# Patient Record
Sex: MALE | Race: WHITE | NOT HISPANIC OR LATINO | Employment: FULL TIME | URBAN - METROPOLITAN AREA
[De-identification: names, ages, dates, MRNs, and addresses within clinical notes are randomized per-mention and may not be internally consistent; named-entity substitution may affect disease eponyms.]

---

## 2018-12-18 ENCOUNTER — TELEPHONE (OUTPATIENT)
Dept: SCHEDULING | Facility: IMAGING CENTER | Age: 33
End: 2018-12-18

## 2018-12-26 ENCOUNTER — OFFICE VISIT (OUTPATIENT)
Dept: INTERNAL MEDICINE | Facility: MEDICAL CENTER | Age: 33
End: 2018-12-26
Payer: COMMERCIAL

## 2018-12-26 VITALS
SYSTOLIC BLOOD PRESSURE: 136 MMHG | WEIGHT: 205 LBS | OXYGEN SATURATION: 96 % | HEIGHT: 67 IN | BODY MASS INDEX: 32.18 KG/M2 | HEART RATE: 100 BPM | TEMPERATURE: 99.1 F | DIASTOLIC BLOOD PRESSURE: 94 MMHG

## 2018-12-26 DIAGNOSIS — S82.091A OTHER CLOSED FRACTURE OF RIGHT PATELLA, INITIAL ENCOUNTER: Primary | ICD-10-CM

## 2018-12-26 DIAGNOSIS — Z72.51 UNPROTECTED SEX: ICD-10-CM

## 2018-12-26 DIAGNOSIS — Z76.89 ENCOUNTER TO ESTABLISH CARE WITH NEW DOCTOR: ICD-10-CM

## 2018-12-26 PROCEDURE — 99204 OFFICE O/P NEW MOD 45 MIN: CPT | Mod: GC | Performed by: INTERNAL MEDICINE

## 2018-12-26 RX ORDER — IBUPROFEN 200 MG
200 TABLET ORAL EVERY 6 HOURS PRN
COMMUNITY
End: 2022-11-29

## 2018-12-26 ASSESSMENT — ENCOUNTER SYMPTOMS
MYALGIAS: 0
PALPITATIONS: 0
FEVER: 0
DOUBLE VISION: 0
COUGH: 0
POLYDIPSIA: 0
HEMOPTYSIS: 0
HEADACHES: 0
DEPRESSION: 0
CHILLS: 0
HEARTBURN: 0
BLURRED VISION: 0
NAUSEA: 0
NECK PAIN: 0
DIZZINESS: 0

## 2018-12-26 ASSESSMENT — PAIN SCALES - GENERAL: PAINLEVEL: NO PAIN

## 2018-12-26 ASSESSMENT — PATIENT HEALTH QUESTIONNAIRE - PHQ9: CLINICAL INTERPRETATION OF PHQ2 SCORE: 0

## 2018-12-26 NOTE — PROGRESS NOTES
"New Patient to Establish    Reason to establish: New patient to establish    CC: Right knee pain    HPI: Patient is a 33-year-old male who comes into the clinic for right knee pain that started on 11/28/2018 after playing football.  He denies any trauma during the game.  Knee started swelling up with tightness around the right knee.  Patient has a history of these episodes(3-4/year) which usually subside after 2-3 days.  During this occasion, the swelling/pain did not go away.  Patient went to [facility] urgent care week after the event.  X-ray done at the urgent care showed \"nonunion lateral patellar fracture with large joint effusion\".  Patient was discharged with crutches and compression wrap.    Patient recently moved from [facility] after his marriage ended 13-14 years.  During this time patient was found to be hypertensive for which his [place] PCP started antihypertensive medication.  Patient did not know which one but he reports compliance with meds.    Patient reports smoking 1/2 pack per day for the last 18 years.  Binge drinking of 6 beers/day for 2-3 times per week.  He stopped drinking at the age of 25 after being a heavy drinker from [age].  He recently started drinking again due to stress from separation from partner of 14 years.    Patient is sexually active with one partner and no use of condoms.  Partner has had a hysterectomy.  He has never been tested for any STI's.    There are no active problems to display for this patient.      Past Medical History:   Diagnosis Date   • Hypertension        Current Outpatient Prescriptions   Medication Sig Dispense Refill   • ibuprofen (MOTRIN) 200 MG Tab Take 200 mg by mouth every 6 hours as needed.       No current facility-administered medications for this visit.        Allergies as of 12/26/2018   • (No Known Allergies)       Social History     Social History   • Marital status:      Spouse name: N/A   • Number of children: N/A   • " "Years of education: N/A     Occupational History   • Not on file.     Social History Main Topics   • Smoking status: Current Some Day Smoker     Packs/day: 0.50     Years: 18.00     Types: Cigarettes   • Smokeless tobacco: Never Used      Comment: per pt varies sometimes a couple cigarettes sometimes none   • Alcohol use Yes      Comment: per pt 2-3 times a week   • Drug use: No   • Sexual activity: Yes     Partners: Female     Birth control/ protection: Surgical      Comment: pt girlfriend had hysterectomy     Other Topics Concern   • Not on file     Social History Narrative   • No narrative on file       Family History   Problem Relation Age of Onset   • Diabetes Mother    • Stroke Father        History reviewed. No pertinent surgical history.  Review of Systems   Constitutional: Negative for chills and fever.   HENT: Negative for hearing loss and tinnitus.    Eyes: Negative for blurred vision and double vision.   Respiratory: Negative for cough and hemoptysis.    Cardiovascular: Negative for chest pain and palpitations.   Gastrointestinal: Negative for heartburn and nausea.   Genitourinary: Negative for dysuria and urgency.   Musculoskeletal: Negative for myalgias and neck pain.   Skin: Negative for itching and rash.   Neurological: Negative for dizziness and headaches.   Endo/Heme/Allergies: Negative for environmental allergies and polydipsia.   Psychiatric/Behavioral: Negative for depression and suicidal ideas.       /94 (BP Location: Right arm, Patient Position: Sitting, BP Cuff Size: Adult long)   Pulse 100   Temp 37.3 °C (99.1 °F) (Temporal)   Ht 1.69 m (5' 6.53\")   Wt 93 kg (205 lb)   SpO2 96%   BMI 32.56 kg/m²     Physical Exam  General:  Alert and oriented, No apparent distress.    Eyes: Pupils equal and reactive. No scleral icterus.    Throat: Clear no erythema or exudates noted.    Neck: Supple. No lymphadenopathy noted. Thyroid not enlarged.    Lungs: Clear to auscultation and percussion " "bilaterally.    Cardiovascular: Regular rate and rhythm. No murmurs, rubs or gallops.    Abdomen:  Benign. No rebound or guarding noted.    Extremities: 1+ edema at right knee.  Tender to palpation at right lateral knee.  Pulses intact at dorsalis pedis.  3/5 strength at right quadricep.  5/5 at rest and extremities.  No erythema.  No clubbing, cyanosis.    Skin: Clear. No rash or suspicious skin lesions noted.      Assessment and Plan    1. Closed fracture of right patella, initial encounter  -Patient is a 33-year-old male who comes into the clinic for right knee pain that started on 11/28/2018 after playing football.  -X-ray done at the urgent care showed \"nonunion lateral patellar fracture with large joint effusion\".  -1/10 on pain scale    Plan  -Advised patient to avoid any strenuous activity  -Referral to orthopedics  -PRN Ibuprofen     2. Unprotected sex  -Patient reports being sexually active with one partner without condom use.  Partner has had a hysterectomy in the past  -He was never tested for STI's    Plan  Chlamydia/gonorrhea  Syphilis  HIV test were upper ordered      3. Encounter to establish care with new doctor  Patient recently moved from [facility] after separation from wife for 14 years  He is also here to establish new PCP  Normal blood work on record.    Plan  CBC/CMP  TSH with reflex T4  Lipid panel    4. HTN  -Blood pressure at this visit is 136/94  -Unknown medication  -Advised pt to either call in regarding which medication he's taking      Followup: No Follow-up on file.    Risk Assessment (discuss potential complications a function of chronic problems):     Complexity (discuss number of co-morbidities):     Signed by: Tanner Hahn M.D.  "

## 2021-08-14 ENCOUNTER — HOSPITAL ENCOUNTER (EMERGENCY)
Facility: MEDICAL CENTER | Age: 36
End: 2021-08-14
Attending: EMERGENCY MEDICINE | Admitting: EMERGENCY MEDICINE
Payer: COMMERCIAL

## 2021-08-14 VITALS
HEIGHT: 67 IN | RESPIRATION RATE: 15 BRPM | HEART RATE: 98 BPM | OXYGEN SATURATION: 94 % | SYSTOLIC BLOOD PRESSURE: 151 MMHG | TEMPERATURE: 98 F | DIASTOLIC BLOOD PRESSURE: 93 MMHG | WEIGHT: 219.36 LBS | BODY MASS INDEX: 34.43 KG/M2

## 2021-08-14 DIAGNOSIS — J02.9 PHARYNGITIS, UNSPECIFIED ETIOLOGY: ICD-10-CM

## 2021-08-14 DIAGNOSIS — H66.90 ACUTE OTITIS MEDIA, UNSPECIFIED OTITIS MEDIA TYPE: ICD-10-CM

## 2021-08-14 LAB — S PYO DNA SPEC NAA+PROBE: NOT DETECTED

## 2021-08-14 PROCEDURE — 700111 HCHG RX REV CODE 636 W/ 250 OVERRIDE (IP): Performed by: EMERGENCY MEDICINE

## 2021-08-14 PROCEDURE — 87651 STREP A DNA AMP PROBE: CPT

## 2021-08-14 PROCEDURE — 99283 EMERGENCY DEPT VISIT LOW MDM: CPT

## 2021-08-14 RX ORDER — AMOXICILLIN 500 MG/1
1000 CAPSULE ORAL DAILY
Qty: 20 CAPSULE | Refills: 0 | Status: SHIPPED | OUTPATIENT
Start: 2021-08-14 | End: 2021-08-24

## 2021-08-14 RX ORDER — DEXAMETHASONE SODIUM PHOSPHATE 10 MG/ML
10 INJECTION, SOLUTION INTRAMUSCULAR; INTRAVENOUS ONCE
Status: COMPLETED | OUTPATIENT
Start: 2021-08-14 | End: 2021-08-14

## 2021-08-14 RX ADMIN — DEXAMETHASONE SODIUM PHOSPHATE 10 MG: 10 INJECTION INTRAMUSCULAR; INTRAVENOUS at 05:01

## 2021-08-14 ASSESSMENT — ENCOUNTER SYMPTOMS
HEADACHES: 0
SHORTNESS OF BREATH: 0
COUGH: 0
STRIDOR: 0
VOMITING: 0
MYALGIAS: 0
SORE THROAT: 1
FEVER: 0
NAUSEA: 0

## 2021-08-14 NOTE — ED TRIAGE NOTES
"Chief Complaint   Patient presents with   • Tongue Swelling     Pt c/o of swollen tongue, paitent having difficulty swallowing. Denies SOB or difficulty breathinging. Started yesterday morning.    • Difficulty Swallowing     Patient states it is painful to swallow, paitent lympsh nodes are swollen.        34 yo M to triage for above complaint. Patient states yesterday morning he woke up feeling he had a \"frog in his throat.\" Patient c/o of pain when swallowing and states that he feels like his tongue swollen. Denies difficulty breathing, or SOB. Reports swollen lymph nodes near the submandibular area bilateral. Denies fevers. Denies history of strep throat. SpO2 >90%. GCS 15    Pt is alert and oriented, speaking in full sentences, follows commands and responds appropriately to questions. NAD. Resp are even and unlabored.      Pt placed in lobby. Pt educated on triage process. Pt encouraged to alert staff for any changes.     Patient and staff wearing appropriate PPE    BP (!) 165/127   Pulse (!) 116   Temp 36.9 °C (98.4 °F) (Oral)   Resp 16   Ht 1.702 m (5' 7\")   Wt 99.5 kg (219 lb 5.7 oz)   SpO2 95%   BMI 34.36 kg/m²   "

## 2021-08-14 NOTE — DISCHARGE INSTRUCTIONS
If you develop increased symptoms, difficulty swallowing or breathing please return for reevaluation.  Anticipate you should begin to feel better in the next 24 to 48 hours.

## 2021-08-14 NOTE — ED PROVIDER NOTES
ED Provider Note    ED Provider Note    Primary care provider: Tanner Hahn M.D. (Inactive)  Means of arrival: POV  History obtained from: patient  History limited by: NOne    CHIEF COMPLAINT  Chief Complaint   Patient presents with   • Tongue Swelling     Pt c/o of swollen tongue, paitent having difficulty swallowing. Denies SOB or difficulty breathinging. Started yesterday morning.    • Difficulty Swallowing     Patient states it is painful to swallow, paitent lympsh nodes are swollen.        HPI  Ron Lagunas is a 35 y.o. male who presents to the Emergency Department with a chief complaint of a sore throat and difficulty swallowing.  Patient states it started yesterday morning but was more severe, tonight, it worsened, prompting his ED visit.  He actually states that he panicked a bit.  It is more swelling at the back of his throat and in his submandibular area.  He denies a fever.  He does have bilateral ear pain.  He was able to tolerate Ramen last night.  No nausea or vomiting.  No known sick contacts.  He states he is overall healthy and rarely gets sick.    REVIEW OF SYSTEMS  Review of Systems   Constitutional: Negative for fever.   HENT: Positive for sore throat.    Respiratory: Negative for cough, shortness of breath and stridor.    Gastrointestinal: Negative for nausea and vomiting.   Musculoskeletal: Negative for myalgias.   Neurological: Negative for headaches.   All other systems reviewed and are negative.      PAST MEDICAL HISTORY   has a past medical history of Hypertension.    SURGICAL HISTORY  patient denies any surgical history    SOCIAL HISTORY  Social History     Tobacco Use   • Smoking status: Current Some Day Smoker     Packs/day: 0.50     Years: 18.00     Pack years: 9.00     Types: Cigarettes   • Smokeless tobacco: Former User   • Tobacco comment: per pt varies sometimes a couple cigarettes sometimes none   Vaping Use   • Vaping Use: Never used   Substance Use Topics   • Alcohol use:  "Not Currently     Comment: per pt 2-3 times a week   • Drug use: No      Social History     Substance and Sexual Activity   Drug Use No       FAMILY HISTORY  Family History   Problem Relation Age of Onset   • Diabetes Mother    • Stroke Father        CURRENT MEDICATIONS  Home Medications     Reviewed by Ping Rosa R.N. (Registered Nurse) on 08/14/21 at 0350  Med List Status: Partial   Medication Last Dose Status   ibuprofen (MOTRIN) 200 MG Tab  Active                ALLERGIES  No Known Allergies    PHYSICAL EXAM  VITAL SIGNS: /93   Pulse 98   Temp 36.7 °C (98 °F) (Temporal)   Resp 15   Ht 1.702 m (5' 7\")   Wt 99.5 kg (219 lb 5.7 oz)   SpO2 94%   BMI 34.36 kg/m²   Vitals reviewed.  Constitutional: Patient is oriented to person, place, and time. Appears well-developed and well-nourished.  Mild distress.    Head: Normocephalic and atraumatic.   Ears: Normal external ears bilaterally.  TMs appear erythematous, dull.  Mouth/Throat: Oropharynx is clear and moist, no exudates. Patient has bilateral, symmetric, soft tissue swelling and symmetric tonsillar swelling.  Eyes: Conjunctivae are normal. Pupils are equal and round.  Neck: Normal range of motion. Neck supple.  Cardiovascular: Mild tachycardia, regular rhythm and normal heart sounds.   Pulmonary/Chest: Effort normal and breath sounds normal. No respiratory distress, no wheezes.  Abdominal: Soft. Bowel sounds are normal. There is no tenderness.  Musculoskeletal: No edema   Lymphadenopathy: Bilateral anterior cervical adenopathy and submandibular lymphadenopathy.   Neurological: No focal deficits.   Skin: Skin is warm and dry. No erythema. No pallor.   Psychiatric: Patient has a normal mood and affect.     LABS  Results for orders placed or performed during the hospital encounter of 08/14/21   Group A Strep by PCR    Specimen: Throat   Result Value Ref Range    Group A Strep by PCR Not Detected Not Detected       All labs reviewed by " me.    COURSE & MEDICAL DECISION MAKING  Pertinent Labs & Imaging studies reviewed. (See chart for details)    Obtained and reviewed past medical records.  Patient's has 10other encounter in our EMR.  In 2018 he was seen in the internal medicine resident clinic to establish care.    4:23 AM - Patient seen and examined at bedside.  This is a pleasant 35-year-old male.  He presents with an isolated sore throat.  He denies a fever.  He is a mild tachycardia in the low 100s.  He is able to swallow.  He is managing his own secretions.  No clinical evidence of Ludwigs angina at this time. his voice is unchanged.  He has symmetric edema and erythema to his posterior oropharynx.  Clinically, his exam is not suggestive of peritonsillar abscess.  Patient be treated with Decadron.  I have obtained a strep swab.  I discussed with the patient viral versus bacterial etiology.  If strep is positive, will plan for treatment with antibiotics otherwise, we discussed supportive care for viral illnesses.    0640AM patient is reevaluated at the bedside.  Strep is negative.  Patient is not interested in Covid swabbing.  He remains afebrile.  There is no increased work of breathing.  He is not hypoxic or tachypneic.  He is not tachycardic.  He does have concern for ear infection.  And I have advised treatment for this.  He will be discharged home with a course of amoxicillin.  He is otherwise well-appearing and nontoxic.  He is given strict return precautions.  If he develops any difficulty breathing, increased swelling or in general, does not feel as though he is improving, he is advised to return for reevaluation and he is agreeable to this plan of care.  He is discharged in stable condition.      FINAL IMPRESSION  1. Pharyngitis, unspecified etiology    2. Acute otitis media, unspecified otitis media type

## 2022-02-18 ENCOUNTER — OFFICE VISIT (OUTPATIENT)
Dept: INTERNAL MEDICINE | Facility: OTHER | Age: 37
End: 2022-02-18
Payer: COMMERCIAL

## 2022-02-18 VITALS
TEMPERATURE: 98.3 F | HEART RATE: 96 BPM | DIASTOLIC BLOOD PRESSURE: 100 MMHG | OXYGEN SATURATION: 93 % | SYSTOLIC BLOOD PRESSURE: 159 MMHG

## 2022-02-18 DIAGNOSIS — Z72.0 TOBACCO ABUSE: ICD-10-CM

## 2022-02-18 DIAGNOSIS — I10 HYPERTENSION, UNSPECIFIED TYPE: ICD-10-CM

## 2022-02-18 DIAGNOSIS — E66.9 OBESITY (BMI 30-39.9): ICD-10-CM

## 2022-02-18 DIAGNOSIS — M25.461 SWOLLEN R KNEE: ICD-10-CM

## 2022-02-18 PROBLEM — Q74.1: Status: ACTIVE | Noted: 2019-01-04

## 2022-02-18 PROCEDURE — 99213 OFFICE O/P EST LOW 20 MIN: CPT | Mod: GE | Performed by: HOSPITALIST

## 2022-02-18 RX ORDER — BLOOD PRESSURE TEST KIT
1 KIT MISCELLANEOUS ONCE
Qty: 1 KIT | Refills: 0 | Status: SHIPPED | OUTPATIENT
Start: 2022-02-18 | End: 2022-02-18

## 2022-02-18 RX ORDER — IBUPROFEN 800 MG/1
800 TABLET ORAL
Qty: 8 TABLET | Refills: 0 | Status: SHIPPED | OUTPATIENT
Start: 2022-02-18 | End: 2022-11-29

## 2022-02-18 RX ORDER — AMLODIPINE BESYLATE 5 MG/1
5 TABLET ORAL DAILY
Qty: 30 TABLET | Refills: 1 | Status: SHIPPED | OUTPATIENT
Start: 2022-02-18 | End: 2022-11-29 | Stop reason: SDUPTHER

## 2022-02-18 ASSESSMENT — PATIENT HEALTH QUESTIONNAIRE - PHQ9: CLINICAL INTERPRETATION OF PHQ2 SCORE: 0

## 2022-02-18 NOTE — PATIENT INSTRUCTIONS
Please Take only 3 of the 800mg of ibuprofen a day with meals. Only take 1 today (2/18/21), 3 on Saturday, 3 on Sunday, and 1 Monday morning. If the swelling is not improved on Monday, please proceed to an orthopedic urgent care so that the fluid in your knee can be removed and analyzed.     Please follow-up in 2 weeks after starting the amlodipine to have you blood pressure checked; please complete lab work by this 2-week visit.

## 2022-02-20 ASSESSMENT — ENCOUNTER SYMPTOMS
FALLS: 0
CHILLS: 0
FEVER: 0
DIZZINESS: 1
DIARRHEA: 0
BLURRED VISION: 0
CLAUDICATION: 0
HEADACHES: 0
ABDOMINAL PAIN: 0
SHORTNESS OF BREATH: 0
CONSTIPATION: 0

## 2022-02-20 NOTE — PROGRESS NOTES
"Subjective     Ron Lagunas is a 36 y.o. male who presents with New Patient, Knee Pain (/Right knee, x4 days), and Hypertension (Follow up)          1. Swollen R knee  Patient reports waking up 3 to 4 days ago with his right knee swollen.  Patient reports that he injured his knee in 2018 and since then has had some right knee swelling and pain with cold temperatures.  Patient reports that he felt the same kind of sensation occurring 5 days ago, with the swelling and decreased ROM worsening 3 to 4 days ago. Patient reports that he presents for evaluation because the swelling has failed to resolve despite rest.  Patient also reports difficulty bending his right knee without significant pain.  Patient reports he has been taking 200 mg ibuprofen and low-dose Tylenol for the pain as well as implementing elevation and use of ice.  Patient reports that during previous incidences when it was cold, his right knee swelling usually resolved in 1 to 2 days.  Patient denies any inciting trauma or injury.  Patient reports his diet consist mainly of pork chops, chicken, and pizza.  Patient reports that he last drank alcohol 3-4 days (prior to the right knee swelling) and had 3-4 beers.  Patient also reports an incident in which his left big toe was swollen.  Patient reports that his left great toe was swollen and painful.  He reports he did not seek medical attention and it resolved with time.  Patient reports that he has been using crutches for ambulation secondary to his right knee pain.  Patient reports that since he injured his right knee in 2018 he sometimes appreciates right knee clicking.  Patient reports that if his right knee swelling is secondary to gout, he is not interested in any gout Medication because \"he has heard about the side effects\".  Patient is adverse to needles, and is not open to having his right knee tapped and the fluid analyzed at this time.    2. Hypertension, unspecified type  Patient reports " "that since high school he has had high blood pressure.  He also reports that both of this parents and both of his brothers have high blood pressure. Patient denies any headache or blurry vision he does report some lightheadedness.  Patient reports he does not take his blood pressure at home and he is not interested in a blood pressure cuff because he is not sure if insurance can cover it.  Patient reports at 1 point he was on medication for his high blood pressure, but he is unsure what the medication is.  Patient reports that his exercise is limited secondary to his right knee injury in 2018.  His diet consist primarily of sodium heavy foods such as pizza, chicken, and pork chops.  Patient reports that because he had such a long history with high blood pressure, he thinks it is important to start taking medications as opposed to just lifestyle modifications to control his blood pressure.    3. Tobacco abuse  Patient reports intermittent tobacco use.  Patient reports that he has been smoking since the age of 16 years old, but has not had a cigarette in the last 3 to 4 days.  Patient reports that he usually smokes when he drinks.  Patient is not interested in discussing tobacco cessation at this time because he reports he only smokes \"now and then\".  Patient reports that he also uses marijuana intermittently.  Patient reports that when he smokes he also usually needs to drink because \"they go together\".    4. Obesity (BMI 30-39.9)  Patient reports that he is aware of his weight and how it can negatively affect his health.  Patient reports that once he can get his knee swelling under control and his knee pain he can incorporate more exercise into his daily regimen.  Patient is not interested in discussing diet modifications at this time but will consider incorporating more exercise.    Review of Systems   Constitutional: Negative for chills, fever and malaise/fatigue.   HENT: Negative for congestion.    Eyes: Negative " for blurred vision.   Respiratory: Negative for shortness of breath.    Cardiovascular: Negative for chest pain and claudication.   Gastrointestinal: Negative for abdominal pain, constipation and diarrhea.   Musculoskeletal: Positive for joint pain. Negative for falls.        Joint swelling   Neurological: Positive for dizziness. Negative for headaches.          Objective     /100 (BP Location: Right arm, Patient Position: Sitting, BP Cuff Size: Adult)   Pulse 96   Temp 36.8 °C (98.3 °F) (Temporal)   SpO2 93%      Physical Exam  Constitutional:       Appearance: Normal appearance. He is obese.   HENT:      Head: Normocephalic and atraumatic.   Cardiovascular:      Rate and Rhythm: Normal rate and regular rhythm.      Pulses: Normal pulses.   Pulmonary:      Effort: Pulmonary effort is normal. No respiratory distress.   Abdominal:      General: There is distension.      Tenderness: There is no guarding.   Musculoskeletal:         General: Swelling and tenderness present.      Comments: Right knee is swollen without erythema.  Right knee is extremely tender to touch.  Right knee without range of motion secondary to pain.   Skin:     General: Skin is warm and dry.      Findings: No erythema.   Neurological:      General: No focal deficit present.      Mental Status: He is alert and oriented to person, place, and time.   Psychiatric:         Mood and Affect: Mood normal.         Behavior: Behavior normal.              Assessment & Plan        1. Swollen R knee  Patient presents with 3 to 4 days of right knee swelling and pain.  Patient woke up with the knee swollen and extremely painful the timeframe also corresponds to an excess of alcohol use.  Patient also reports an incident in which his left big toe was swollen and painful.  Patient's right swollen knee likely secondary to an acute gout attack.  But could also be from trauma.  Patient is adverse to arthrocentesis at this time as well as acute and chronic  gout medication.  Patient is open to an NSAID however and will proceed to an orthopedic urgent care on Monday should the swelling and pain not subside.  - Ibuprofen 800mg TID for 3 days  - Rest, ice, and elevation  - Continue to use crutches for ambulation  - Presents orthopedic urgent care on Monday, February 21 for arthrocentesis and further evaluation if swelling and pain does not subside  - URIC ACID; Future  - TSH+FREE T4    2. Hypertension, unspecified type  Home BP kit will be ordered, to encourage blood pressure measurements at home.   Home blood pressure monitoring:  - check your blood pressure every day and put it in a log  - pick a different time each day so we can see how it varies throughout the day  - when you check your blood pressure: make sure you sit quietly for 5-10 minutes beforehand, keep both feet flat on the ground, and make sure you use an arm cuff at heart height  Lifestyle factors to help manage blood pressure:  1) reduce stress with daily activity, consider yoga, breathing exercises (like 4-7-8 breathing technique)   2) reduce Na to <2000 mg/day  3) DASH diet     4) 200-300min/week cardio, which you can build up to.    - Amlodipine 5mg daily  - CBC WITH DIFFERENTIAL; Future  - Comp Metabolic Panel; Future  - Lipid Profile; Future  - HEMOGLOBIN A1C; Future  - TSH+FREE T4  - Patient is to follow-up in 2 weeks to assess the effectiveness of amlodipine as well as exercise and diet measures on blood pressure    3. Tobacco abuse  Discussed smoking cessation. Patient not interested in quitting at this time.   - CBC WITH DIFFERENTIAL; Future  - Revisit smoking cessation conversation at next visit    4. Obesity (BMI 30-39.9)  - Encouraged diet high in fruits, vegetables, and fiber. And a diet low in salt, refined carbohydrates, cholesterol, saturated fat, and trans fatty acids.    - Encouraged  a minimum of 30 minutes of moderate intensity aerobic exercise (eg, brisk walking) is recommended on five  days each week. Or 20 minutes of vigorous-intensity aerobic exercise (eg, jogging) on three days each week.   - URIC ACID; Future  - Lipid Profile; Future  - HEMOGLOBIN A1C; Future  - TSH+FREE T4    Follow-up instructions: Please Take only 3 of the 800mg of ibuprofen a day with meals. Only take 1 today (2/18/21), 3 on Saturday, 3 on Sunday, and 1 Monday morning. If the swelling is not improved on Monday, please proceed to an orthopedic urgent care so that the fluid in your knee can be removed and analyzed.     Please follow-up in 2 weeks after starting the amlodipine to have you blood pressure checked; please complete lab work by this 2-week visit.

## 2022-11-29 ENCOUNTER — OFFICE VISIT (OUTPATIENT)
Dept: INTERNAL MEDICINE | Facility: OTHER | Age: 37
End: 2022-11-29
Payer: COMMERCIAL

## 2022-11-29 VITALS
HEIGHT: 68 IN | SYSTOLIC BLOOD PRESSURE: 162 MMHG | TEMPERATURE: 98.7 F | OXYGEN SATURATION: 95 % | HEART RATE: 94 BPM | WEIGHT: 216 LBS | DIASTOLIC BLOOD PRESSURE: 86 MMHG | BODY MASS INDEX: 32.74 KG/M2

## 2022-11-29 DIAGNOSIS — R20.0 BILATERAL FINGER NUMBNESS: ICD-10-CM

## 2022-11-29 DIAGNOSIS — E66.9 OBESITY (BMI 30-39.9): ICD-10-CM

## 2022-11-29 DIAGNOSIS — I10 HYPERTENSION, UNSPECIFIED TYPE: ICD-10-CM

## 2022-11-29 PROCEDURE — 99213 OFFICE O/P EST LOW 20 MIN: CPT | Mod: GE | Performed by: STUDENT IN AN ORGANIZED HEALTH CARE EDUCATION/TRAINING PROGRAM

## 2022-11-29 RX ORDER — AMLODIPINE BESYLATE 5 MG/1
5 TABLET ORAL DAILY
Qty: 30 TABLET | Refills: 1 | Status: SHIPPED | OUTPATIENT
Start: 2022-11-29 | End: 2023-03-30 | Stop reason: SDUPTHER

## 2022-11-29 RX ORDER — LISINOPRIL 10 MG/1
10 TABLET ORAL DAILY
Qty: 30 TABLET | Refills: 3 | Status: SHIPPED | OUTPATIENT
Start: 2022-11-29 | End: 2023-03-30

## 2022-11-29 ASSESSMENT — ENCOUNTER SYMPTOMS
DOUBLE VISION: 0
DIZZINESS: 0
BLURRED VISION: 0
FEVER: 0
HEADACHES: 0
CHILLS: 0
PALPITATIONS: 0
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
## 2023-03-30 ENCOUNTER — OFFICE VISIT (OUTPATIENT)
Dept: INTERNAL MEDICINE | Facility: OTHER | Age: 38
End: 2023-03-30
Payer: COMMERCIAL

## 2023-03-30 VITALS
TEMPERATURE: 97.5 F | WEIGHT: 206.4 LBS | BODY MASS INDEX: 31.28 KG/M2 | SYSTOLIC BLOOD PRESSURE: 155 MMHG | OXYGEN SATURATION: 95 % | HEART RATE: 90 BPM | HEIGHT: 68 IN | DIASTOLIC BLOOD PRESSURE: 101 MMHG

## 2023-03-30 DIAGNOSIS — F33.2 SEVERE EPISODE OF RECURRENT MAJOR DEPRESSIVE DISORDER, WITHOUT PSYCHOTIC FEATURES (HCC): ICD-10-CM

## 2023-03-30 DIAGNOSIS — I10 PRIMARY HYPERTENSION: ICD-10-CM

## 2023-03-30 DIAGNOSIS — Z13.228 SCREENING FOR METABOLIC DISORDER: ICD-10-CM

## 2023-03-30 DIAGNOSIS — Z11.3 SCREEN FOR STD (SEXUALLY TRANSMITTED DISEASE): ICD-10-CM

## 2023-03-30 DIAGNOSIS — E66.9 OBESITY (BMI 30-39.9): ICD-10-CM

## 2023-03-30 PROBLEM — F32.9 MAJOR DEPRESSIVE DISORDER: Status: ACTIVE | Noted: 2023-03-30

## 2023-03-30 PROCEDURE — 99214 OFFICE O/P EST MOD 30 MIN: CPT | Mod: GC | Performed by: INTERNAL MEDICINE

## 2023-03-30 RX ORDER — AMLODIPINE BESYLATE 5 MG/1
5 TABLET ORAL DAILY
Qty: 30 TABLET | Refills: 1 | Status: SHIPPED | OUTPATIENT
Start: 2023-03-30 | End: 2023-09-27 | Stop reason: SDUPTHER

## 2023-03-30 ASSESSMENT — PATIENT HEALTH QUESTIONNAIRE - PHQ9
CLINICAL INTERPRETATION OF PHQ2 SCORE: 5
5. POOR APPETITE OR OVEREATING: 3 - NEARLY EVERY DAY
SUM OF ALL RESPONSES TO PHQ QUESTIONS 1-9: 22

## 2023-03-30 NOTE — PATIENT INSTRUCTIONS
Placed referral to psychology  Start taking amlodipine again for blood pressure  Start taking sertraline for depression  Do labs (fasting) now  Measure blood pressure once daily at the same time of the day. No smoking, alcohol, drugs before measuring blood pressure.  Rest for 10 minutes before measuring blood pressure with feet down on the ground and arm at heart level in sitting position. Measure blood pressure for 2 weeks before next visit. Bring the blood pressure log on next visit.

## 2023-03-31 ASSESSMENT — ENCOUNTER SYMPTOMS
HEADACHES: 0
FEVER: 0
DOUBLE VISION: 0
DIZZINESS: 0
PALPITATIONS: 0
CHILLS: 0
BLURRED VISION: 0

## 2023-03-31 NOTE — PROGRESS NOTES
Chief Complaint   Patient presents with    Depression    Anxiety     Patient here for depression and anxiety       HISTORY OF PRESENT ILLNESS: Patient is a 37 y.o. male established patient who presents today for the following.      #Hypertension  He states that he has not been taking blood pressure medications for 3 months.  Blood pressure in clinic is elevated at 155/101 today.   Denies chest pain, palpitation, shortness of breath, weakness, dizziness/lightheadedness, facial droop, paresthesia, vision change     He states the labs are not done for this visit because he hates needles. He did not get vaccines either because of the same reason. Emphasized the importance of getting labs and vaccines. For now, patient is okay with getting labs since this is more pressing issue in the setting of elevated blood pressure.      #Depression  PHQ was documented in clinic today. PHQ 9 score was 22. He continues to feel depressed for a long time and got worse due to family issues. He is okay with starting sertraline and seeing behavioral health    #smoking  He is still smoking and smokes 1 pack a day.  He refuses to quit at this time because he wants to prioritize dealing with depression.    He works in construction. He states that he will go back to work soon.      He drinks at night and only drinks 1 beer. no current use of recreational drugs. used to smoke weed.  He is  and has one son who is 16.     Past Medical History:   Diagnosis Date    Hypertension        Patient Active Problem List    Diagnosis Date Noted    Major depressive disorder 03/30/2023    Hypertension 02/18/2022    Swollen R knee 02/18/2022    Tobacco abuse 02/18/2022    Obesity (BMI 30-39.9) 02/18/2022    Bifid patella 01/04/2019       Allergies:Patient has no known allergies.    Current Outpatient Medications   Medication Sig Dispense Refill    sertraline (ZOLOFT) 50 MG Tab Take 1 Tablet by mouth every day. 30 Tablet 11    amLODIPine (NORVASC) 5 MG  "Tab Take 1 Tablet by mouth every day. 30 Tablet 1     No current facility-administered medications for this visit.       Social History     Tobacco Use    Smoking status: Some Days     Packs/day: 0.50     Years: 18.00     Pack years: 9.00     Types: Cigarettes    Smokeless tobacco: Former    Tobacco comments:     occasional   Vaping Use    Vaping Use: Never used   Substance Use Topics    Alcohol use: Yes     Comment: per pt 2-3 times a week    Drug use: Yes     Types: Marijuana       Family History   Problem Relation Age of Onset    Diabetes Mother     Stroke Father          Review of Systems   Review of Systems   Constitutional:  Negative for chills and fever.   HENT:  Negative for hearing loss and tinnitus.    Eyes:  Negative for blurred vision and double vision.   Cardiovascular:  Negative for chest pain and palpitations.   Neurological:  Negative for dizziness and headaches.     Exam:  BP (!) 155/101 (BP Location: Right arm, Patient Position: Sitting, BP Cuff Size: Large adult)   Pulse 90   Temp 36.4 °C (97.5 °F) (Temporal)   Ht 1.715 m (5' 7.5\")   Wt 93.6 kg (206 lb 6.4 oz)   SpO2 95%  Body mass index is 31.85 kg/m².    Constitutional:  Not in acute distress, well appearing.  HEENT:   NC/AT  Cardiovascular: Regular rate and rhythm. No murmurs or gallops.      Lungs:   Clear to auscultation bilaterally. No wheezes or crackles. No respiratory distress.  Abdomen: Not distended, soft, not tender. No guarding or rigidity. No masses.  Extremities:  No cyanosis/clubbing/edema. No obvious deformities.  Positive Phalen sign  Skin:  Warm and dry.  No visible rashes.  Neurologic: Alert & oriented x 3, CN II-XII grossly intact, strength and sensation grossly intact.  No focal deficits noted.  Psychiatric:  Affect normal, mood normal, judgment normal.    Assessment/Plan:     1. Severe episode of recurrent major depressive disorder, without psychotic features (HCC)  PHQ was documented in clinic today. PHQ 9 score was " 22. He continues to feel depressed for a long time and got worse due to family issues. He is okay with starting sertraline and seeing behavioral health      Plan:  Start sertraline 50 mg daily  - CBC WITHOUT DIFFERENTIAL; Future  - Comp Metabolic Panel; Future  - Patient has been identified as having a positive depression screening. Appropriate orders and counseling have been given.  - Referral to Psychology  - Referral to Behavioral Health  - TSH WITH REFLEX TO FT4; Future    2. Obesity (BMI 30-39.9)  Declines nutrition referral  - Patient identified as having weight management issue.  Appropriate orders and counseling given.    3. Primary hypertension  He states that he has not been taking blood pressure medications for 3 months.   Blood pressure in clinic is elevated at 155/101 today.    Plan:  Restart amlodipine 5 mg daily, hold off on restarting lisinopril sine we don't have labs.    - Comp Metabolic Panel; Future  - MICROALBUMIN CREAT RATIO URINE; Future  - TSH WITH REFLEX TO FT4; Future    4. Screen for STD (sexually transmitted disease)  - HIV AG/AB COMBO ASSAY SCREENING; Future  - HEP C VIRUS ANTIBODY; Future  - RPR (SYPHILIS); Future    5. Screening for metabolic disorder  - Lipid Profile; Future

## 2023-04-05 ASSESSMENT — ANXIETY QUESTIONNAIRES
GAD7 TOTAL SCORE: 19
1. FEELING NERVOUS, ANXIOUS, OR ON EDGE: NEARLY EVERY DAY
3. WORRYING TOO MUCH ABOUT DIFFERENT THINGS: MORE THAN HALF THE DAYS
6. BECOMING EASILY ANNOYED OR IRRITABLE: NEARLY EVERY DAY
4. TROUBLE RELAXING: NEARLY EVERY DAY
2. NOT BEING ABLE TO STOP OR CONTROL WORRYING: NEARLY EVERY DAY
5. BEING SO RESTLESS THAT IT IS HARD TO SIT STILL: MORE THAN HALF THE DAYS
7. FEELING AFRAID AS IF SOMETHING AWFUL MIGHT HAPPEN: NEARLY EVERY DAY
IF YOU CHECKED OFF ANY PROBLEMS ON THIS QUESTIONNAIRE, HOW DIFFICULT HAVE THESE PROBLEMS MADE IT FOR YOU TO DO YOUR WORK, TAKE CARE OF THINGS AT HOME, OR GET ALONG WITH OTHER PEOPLE: VERY DIFFICULT

## 2023-05-15 ENCOUNTER — APPOINTMENT (OUTPATIENT)
Dept: INTERNAL MEDICINE | Facility: OTHER | Age: 38
End: 2023-05-15
Payer: COMMERCIAL

## 2023-09-27 ENCOUNTER — OFFICE VISIT (OUTPATIENT)
Dept: INTERNAL MEDICINE | Facility: OTHER | Age: 38
End: 2023-09-27
Payer: COMMERCIAL

## 2023-09-27 VITALS
HEART RATE: 94 BPM | HEIGHT: 68 IN | WEIGHT: 199.2 LBS | OXYGEN SATURATION: 94 % | DIASTOLIC BLOOD PRESSURE: 80 MMHG | BODY MASS INDEX: 30.19 KG/M2 | TEMPERATURE: 99.1 F | SYSTOLIC BLOOD PRESSURE: 130 MMHG

## 2023-09-27 DIAGNOSIS — F33.42 RECURRENT MAJOR DEPRESSIVE DISORDER, IN FULL REMISSION (HCC): ICD-10-CM

## 2023-09-27 DIAGNOSIS — I10 HYPERTENSION, UNSPECIFIED TYPE: ICD-10-CM

## 2023-09-27 PROBLEM — M25.461 SWOLLEN R KNEE: Status: RESOLVED | Noted: 2022-02-18 | Resolved: 2023-09-27

## 2023-09-27 PROBLEM — Z72.0 TOBACCO ABUSE: Status: RESOLVED | Noted: 2022-02-18 | Resolved: 2023-09-27

## 2023-09-27 PROCEDURE — 3075F SYST BP GE 130 - 139MM HG: CPT | Performed by: STUDENT IN AN ORGANIZED HEALTH CARE EDUCATION/TRAINING PROGRAM

## 2023-09-27 PROCEDURE — 99213 OFFICE O/P EST LOW 20 MIN: CPT | Mod: GE | Performed by: STUDENT IN AN ORGANIZED HEALTH CARE EDUCATION/TRAINING PROGRAM

## 2023-09-27 PROCEDURE — 3079F DIAST BP 80-89 MM HG: CPT | Performed by: STUDENT IN AN ORGANIZED HEALTH CARE EDUCATION/TRAINING PROGRAM

## 2023-09-27 RX ORDER — AMLODIPINE BESYLATE 5 MG/1
5 TABLET ORAL DAILY
Qty: 90 TABLET | Refills: 1 | Status: SHIPPED | OUTPATIENT
Start: 2023-09-27

## 2023-09-27 ASSESSMENT — ENCOUNTER SYMPTOMS
DIZZINESS: 0
DOUBLE VISION: 0
BLURRED VISION: 0
NAUSEA: 0
PALPITATIONS: 0
HEARTBURN: 0
CHILLS: 0
HEADACHES: 0
FEVER: 0

## 2023-09-27 NOTE — PATIENT INSTRUCTIONS
Thank you remain today, please follow-up with your PCP in 3 months  Please get labs drawn (fasting) least 1 week before you see your PCP  Please call our clinic if you have any future questions or concerns, or if you would like to be seen sooner for any reason

## 2023-09-27 NOTE — PROGRESS NOTES
Chief Complaint   Patient presents with    Medication Refill       HISTORY OF PRESENT ILLNESS: Patient is a 38 y.o. male established patient who presents today for the following.      1. Hypertension, unspecified type  Patient requesting refill of amlodipine.  States has been on this medicine for the past few months and has not caused him any significant side effects.  Patient not routinely check his blood pressure, but when he does he is noted to be normal.  Patient also states he has been able to lose weight and cut back on his alcohol and smoking over the past few months as well.    2. Recurrent major depressive disorder, in full remission (HCC)  Patient with history above, previously on sertraline.  Patient states he stopped taking this medicine shortly after initiation due to overall improvement in mood.  Patient states he was able to get back to work and was able to work on cutting back on alcohol and overall smoking use.  He states his mood has been greatly improved and has not had any significant issues with his mood or depression.      Past Medical History:   Diagnosis Date    Hypertension        Patient Active Problem List    Diagnosis Date Noted    Major depressive disorder 03/30/2023    Hypertension 02/18/2022    Obesity (BMI 30-39.9) 02/18/2022    Bifid patella 01/04/2019       Allergies:Patient has no known allergies.    Current Outpatient Medications   Medication Sig Dispense Refill    amLODIPine (NORVASC) 5 MG Tab Take 1 Tablet by mouth every day. 90 Tablet 1     No current facility-administered medications for this visit.       Social History     Tobacco Use    Smoking status: Some Days     Current packs/day: 0.50     Average packs/day: 0.5 packs/day for 18.0 years (9.0 ttl pk-yrs)     Types: Cigarettes    Smokeless tobacco: Former    Tobacco comments:     occasional   Vaping Use    Vaping Use: Never used   Substance Use Topics    Alcohol use: Yes     Comment: per pt 2-3 times a week    Drug use:  "Yes     Types: Marijuana       Family History   Problem Relation Age of Onset    Diabetes Mother     Stroke Father          Review of Systems   Review of Systems   Constitutional:  Negative for chills and fever.   Eyes:  Negative for blurred vision and double vision.   Cardiovascular:  Negative for chest pain and palpitations.   Gastrointestinal:  Negative for heartburn and nausea.   Neurological:  Negative for dizziness and headaches.       Exam:  /80 (BP Location: Left arm, Patient Position: Sitting, BP Cuff Size: Adult)   Pulse 94   Temp 37.3 °C (99.1 °F) (Temporal)   Ht 1.715 m (5' 7.5\")   Wt 90.4 kg (199 lb 3.2 oz)   SpO2 94%  Body mass index is 30.74 kg/m².    Constitutional:  Not in acute distress, well appearing.  HEENT:   NC/AT  Cardiovascular: Regular rate and rhythm. No murmurs or gallops.      Lungs:   Clear to auscultation bilaterally. No wheezes or crackles. No respiratory distress.  Abdomen: Not distended, soft, not tender. No guarding or rigidity. No masses.  Extremities:  No cyanosis/clubbing/edema. No obvious deformities.  Skin:  Warm and dry.  No visible rashes.  Neurologic: Alert & oriented x 3, CN II-XII grossly intact, strength and sensation grossly intact.  No focal deficits noted.  Psychiatric:  Affect normal, mood normal, judgment normal.    Assessment/Plan:     1. Hypertension, unspecified type  Blood pressure well controlled on current regimen of amlodipine 5 mg.  Suspect as patient continues to lose weight and cut back on alcohol, patient may no longer need this medicine.  - Encouraged patient to continue overall lifestyle modification, and to monitor blood pressure at home.  - PCP to consider coming off this medicine as appropriate during future encounters.    2. Recurrent major depressive disorder, in full remission (HCC)  Patient with history above, previously on Zoloft.  Patient states remission of his depression after landing new job and optimizing his overall " lifestyle.  - Congratulated patient on steps taken to improve lifestyle and mood.      All imaging results and lab results and consult notes are reviewed at this visit.  Followup: Return in about 3 months (around 12/27/2023) for F/u labs, monitor HTN.    Please note that this dictation was created using voice recognition software. I have made every reasonable attempt to correct obvious errors, but I expect that there are errors of grammar and possibly content that I did not discover before finalizing the note.    Rusty Tejada MD  PGY-3  Internal Medicine

## 2023-10-06 ENCOUNTER — APPOINTMENT (OUTPATIENT)
Dept: RADIOLOGY | Facility: MEDICAL CENTER | Age: 38
End: 2023-10-06
Attending: EMERGENCY MEDICINE

## 2023-10-06 ENCOUNTER — HOSPITAL ENCOUNTER (EMERGENCY)
Facility: MEDICAL CENTER | Age: 38
End: 2023-10-06
Attending: EMERGENCY MEDICINE
Payer: COMMERCIAL

## 2023-10-06 VITALS
RESPIRATION RATE: 16 BRPM | WEIGHT: 199.3 LBS | BODY MASS INDEX: 30.2 KG/M2 | TEMPERATURE: 98 F | SYSTOLIC BLOOD PRESSURE: 132 MMHG | HEIGHT: 68 IN | OXYGEN SATURATION: 95 % | HEART RATE: 85 BPM | DIASTOLIC BLOOD PRESSURE: 85 MMHG

## 2023-10-06 DIAGNOSIS — S22.42XA CLOSED FRACTURE OF MULTIPLE RIBS OF LEFT SIDE, INITIAL ENCOUNTER: ICD-10-CM

## 2023-10-06 DIAGNOSIS — S42.022A CLOSED DISPLACED FRACTURE OF SHAFT OF LEFT CLAVICLE, INITIAL ENCOUNTER: ICD-10-CM
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
## 2023-10-06 DIAGNOSIS — S42.115A CLOSED NONDISPLACED FRACTURE OF BODY OF LEFT SCAPULA, INITIAL ENCOUNTER: ICD-10-CM

## 2023-10-06 LAB
ANION GAP SERPL CALC-SCNC: 14 MMOL/L (ref 7–16)
BASOPHILS # BLD AUTO: 0.2 % (ref 0–1.8)
BASOPHILS # BLD: 0.03 K/UL (ref 0–0.12)
BUN SERPL-MCNC: 16 MG/DL (ref 8–22)
CALCIUM SERPL-MCNC: 9.3 MG/DL (ref 8.5–10.5)
CHLORIDE SERPL-SCNC: 99 MMOL/L (ref 96–112)
CO2 SERPL-SCNC: 21 MMOL/L (ref 20–33)
CREAT SERPL-MCNC: 1.11 MG/DL (ref 0.5–1.4)
EOSINOPHIL # BLD AUTO: 0.04 K/UL (ref 0–0.51)
EOSINOPHIL NFR BLD: 0.3 % (ref 0–6.9)
ERYTHROCYTE [DISTWIDTH] IN BLOOD BY AUTOMATED COUNT: 43.8 FL (ref 35.9–50)
GFR SERPLBLD CREATININE-BSD FMLA CKD-EPI: 87 ML/MIN/1.73 M 2
GLUCOSE SERPL-MCNC: 131 MG/DL (ref 65–99)
HCT VFR BLD AUTO: 43.9 % (ref 42–52)
HGB BLD-MCNC: 15.1 G/DL (ref 14–18)
IMM GRANULOCYTES # BLD AUTO: 0.06 K/UL (ref 0–0.11)
IMM GRANULOCYTES NFR BLD AUTO: 0.5 % (ref 0–0.9)
LYMPHOCYTES # BLD AUTO: 0.75 K/UL (ref 1–4.8)
LYMPHOCYTES NFR BLD: 6.1 % (ref 22–41)
MCH RBC QN AUTO: 31.6 PG (ref 27–33)
MCHC RBC AUTO-ENTMCNC: 34.4 G/DL (ref 32.3–36.5)
MCV RBC AUTO: 91.8 FL (ref 81.4–97.8)
MONOCYTES # BLD AUTO: 0.52 K/UL (ref 0–0.85)
MONOCYTES NFR BLD AUTO: 4.2 % (ref 0–13.4)
NEUTROPHILS # BLD AUTO: 10.96 K/UL (ref 1.82–7.42)
NEUTROPHILS NFR BLD: 88.7 % (ref 44–72)
NRBC # BLD AUTO: 0 K/UL
NRBC BLD-RTO: 0 /100 WBC (ref 0–0.2)
PLATELET # BLD AUTO: 139 K/UL (ref 164–446)
PMV BLD AUTO: 8.6 FL (ref 9–12.9)
POTASSIUM SERPL-SCNC: 4.8 MMOL/L (ref 3.6–5.5)
RBC # BLD AUTO: 4.78 M/UL (ref 4.7–6.1)
SODIUM SERPL-SCNC: 134 MMOL/L (ref 135–145)
WBC # BLD AUTO: 12.4 K/UL (ref 4.8–10.8)

## 2023-10-06 PROCEDURE — 700102 HCHG RX REV CODE 250 W/ 637 OVERRIDE(OP): Performed by: EMERGENCY MEDICINE

## 2023-10-06 PROCEDURE — 85025 COMPLETE CBC W/AUTO DIFF WBC: CPT

## 2023-10-06 PROCEDURE — A9270 NON-COVERED ITEM OR SERVICE: HCPCS | Performed by: EMERGENCY MEDICINE

## 2023-10-06 PROCEDURE — 96374 THER/PROPH/DIAG INJ IV PUSH: CPT

## 2023-10-06 PROCEDURE — 700117 HCHG RX CONTRAST REV CODE 255: Performed by: EMERGENCY MEDICINE

## 2023-10-06 PROCEDURE — 73000 X-RAY EXAM OF COLLAR BONE: CPT | Mod: LT

## 2023-10-06 PROCEDURE — 700111 HCHG RX REV CODE 636 W/ 250 OVERRIDE (IP): Mod: JZ | Performed by: EMERGENCY MEDICINE

## 2023-10-06 PROCEDURE — 36415 COLL VENOUS BLD VENIPUNCTURE: CPT

## 2023-10-06 PROCEDURE — 71260 CT THORAX DX C+: CPT

## 2023-10-06 PROCEDURE — 90715 TDAP VACCINE 7 YRS/> IM: CPT | Performed by: EMERGENCY MEDICINE

## 2023-10-06 PROCEDURE — 99285 EMERGENCY DEPT VISIT HI MDM: CPT

## 2023-10-06 PROCEDURE — 71101 X-RAY EXAM UNILAT RIBS/CHEST: CPT | Mod: LT

## 2023-10-06 PROCEDURE — 90471 IMMUNIZATION ADMIN: CPT

## 2023-10-06 PROCEDURE — 80048 BASIC METABOLIC PNL TOTAL CA: CPT

## 2023-10-06 RX ORDER — MORPHINE SULFATE 4 MG/ML
4 INJECTION INTRAVENOUS ONCE
Status: COMPLETED | OUTPATIENT
Start: 2023-10-06 | End: 2023-10-06

## 2023-10-06 RX ORDER — OXYCODONE HYDROCHLORIDE AND ACETAMINOPHEN 5; 325 MG/1; MG/1
1 TABLET ORAL EVERY 4 HOURS PRN
Qty: 13 TABLET | Refills: 0 | Status: SHIPPED | OUTPATIENT
Start: 2023-10-06 | End: 2023-10-11

## 2023-10-06 RX ORDER — OXYCODONE HYDROCHLORIDE AND ACETAMINOPHEN 5; 325 MG/1; MG/1
2 TABLET ORAL ONCE
Status: COMPLETED | OUTPATIENT
Start: 2023-10-06 | End: 2023-10-06

## 2023-10-06 RX ADMIN — MORPHINE SULFATE 4 MG: 4 INJECTION, SOLUTION INTRAMUSCULAR; INTRAVENOUS at 20:00

## 2023-10-06 RX ADMIN — IOHEXOL 95 ML: 350 INJECTION, SOLUTION INTRAVENOUS at 20:32

## 2023-10-06 RX ADMIN — OXYCODONE AND ACETAMINOPHEN 2 TABLET: 5; 325 TABLET ORAL at 21:47

## 2023-10-06 RX ADMIN — CLOSTRIDIUM TETANI TOXOID ANTIGEN (FORMALDEHYDE INACTIVATED), CORYNEBACTERIUM DIPHTHERIAE TOXOID ANTIGEN (FORMALDEHYDE INACTIVATED), BORDETELLA PERTUSSIS TOXOID ANTIGEN (GLUTARALDEHYDE INACTIVATED), BORDETELLA PERTUSSIS FILAMENTOUS HEMAGGLUTININ ANTIGEN (FORMALDEHYDE INACTIVATED), BORDETELLA PERTUSSIS PERTACTIN ANTIGEN, AND BORDETELLA PERTUSSIS FIMBRIAE 2/3 ANTIGEN 0.5 ML: 5; 2; 2.5; 5; 3; 5 INJECTION, SUSPENSION INTRAMUSCULAR at 21:48

## 2023-10-07 NOTE — ED NOTES
Bedside report received from off going: OLAF Polanco, assumed care of patient.  POC discussed with patient. Call light within reach, all needs addressed at this time.       Fall risk interventions in place: Not Applicable (all applicable per [facility] Fall risk assessment)   Continuous monitoring: Not Applicable   IVF/IV medications: Not Applicable   Oxygen: Room Air  Bedside sitter: Not Applicable   Isolation: Not Applicable
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
## 2023-10-07 NOTE — ED NOTES
Pt wheeled to blue 17 with all belongings. Pt connected to cardiac monitoring, automatic BP and pulse oximeter. Bed locked and in lowest position. Call light available and within reach. No distress noted. ERP to see.

## 2024-06-05 ENCOUNTER — APPOINTMENT (OUTPATIENT)
Dept: INTERNAL MEDICINE | Facility: OTHER | Age: 39
End: 2024-06-05
Payer: COMMERCIAL

## 2024-07-16 ENCOUNTER — APPOINTMENT (OUTPATIENT)
Dept: INTERNAL MEDICINE | Facility: OTHER | Age: 39
End: 2024-07-16
Payer: COMMERCIAL

## 2024-07-16 ENCOUNTER — OFFICE VISIT (OUTPATIENT)
Dept: INTERNAL MEDICINE | Facility: OTHER | Age: 39
End: 2024-07-16
Payer: COMMERCIAL

## 2024-07-16 VITALS
SYSTOLIC BLOOD PRESSURE: 128 MMHG | HEART RATE: 74 BPM | DIASTOLIC BLOOD PRESSURE: 92 MMHG | TEMPERATURE: 97 F | BODY MASS INDEX: 32.15 KG/M2 | OXYGEN SATURATION: 96 % | HEIGHT: 67 IN | WEIGHT: 204.8 LBS

## 2024-07-16 DIAGNOSIS — D72.829 LEUKOCYTOSIS, UNSPECIFIED TYPE: Primary | ICD-10-CM

## 2024-07-16 DIAGNOSIS — I10 HYPERTENSION, UNSPECIFIED TYPE: ICD-10-CM

## 2024-07-16 DIAGNOSIS — F33.42 RECURRENT MAJOR DEPRESSIVE DISORDER, IN FULL REMISSION (HCC): ICD-10-CM

## 2024-07-16 PROCEDURE — 3074F SYST BP LT 130 MM HG: CPT | Mod: GE

## 2024-07-16 PROCEDURE — 99213 OFFICE O/P EST LOW 20 MIN: CPT | Mod: GE

## 2024-07-16 PROCEDURE — 3080F DIAST BP >= 90 MM HG: CPT | Mod: GE

## 2024-07-16 ASSESSMENT — LIFESTYLE VARIABLES: SUBSTANCE_ABUSE: 0

## 2024-07-16 ASSESSMENT — ENCOUNTER SYMPTOMS
DOUBLE VISION: 0
DIARRHEA: 0
SPUTUM PRODUCTION: 0
WEIGHT LOSS: 0
MYALGIAS: 0
SORE THROAT: 0
BLURRED VISION: 0
HEADACHES: 0
FEVER: 0
NERVOUS/ANXIOUS: 0
DIZZINESS: 0
PALPITATIONS: 0
SHORTNESS OF BREATH: 0
WEAKNESS: 0
VOMITING: 0
BACK PAIN: 0
NAUSEA: 0
CONSTIPATION: 0
COUGH: 0
CHILLS: 0
ABDOMINAL PAIN: 0

## 2024-07-16 ASSESSMENT — PATIENT HEALTH QUESTIONNAIRE - PHQ9
SUM OF ALL RESPONSES TO PHQ9 QUESTIONS 1 AND 2: 0
9. THOUGHTS THAT YOU WOULD BE BETTER OFF DEAD, OR OF HURTING YOURSELF: NOT AT ALL
6. FEELING BAD ABOUT YOURSELF - OR THAT YOU ARE A FAILURE OR HAVE LET YOURSELF OR YOUR FAMILY DOWN: NOT AL ALL
SUM OF ALL RESPONSES TO PHQ QUESTIONS 1-9: 0
7. TROUBLE CONCENTRATING ON THINGS, SUCH AS READING THE NEWSPAPER OR WATCHING TELEVISION: NOT AT ALL
8. MOVING OR SPEAKING SO SLOWLY THAT OTHER PEOPLE COULD HAVE NOTICED. OR THE OPPOSITE, BEING SO FIGETY OR RESTLESS THAT YOU HAVE BEEN MOVING AROUND A LOT MORE THAN USUAL: NOT AT ALL
2. FEELING DOWN, DEPRESSED, IRRITABLE, OR HOPELESS: NOT AT ALL
4. FEELING TIRED OR HAVING LITTLE ENERGY: NOT AT ALL
3. TROUBLE FALLING OR STAYING ASLEEP OR SLEEPING TOO MUCH: NOT AT ALL
5. POOR APPETITE OR OVEREATING: NOT AT ALL
1. LITTLE INTEREST OR PLEASURE IN DOING THINGS: NOT AT ALL

## 2024-07-24 ENCOUNTER — APPOINTMENT (OUTPATIENT)
Dept: INTERNAL MEDICINE | Facility: OTHER | Age: 39
End: 2024-07-24
Payer: COMMERCIAL

## 2025-01-29 ENCOUNTER — HOSPITAL ENCOUNTER (EMERGENCY)
Facility: MEDICAL CENTER | Age: 40
End: 2025-01-29
Attending: EMERGENCY MEDICINE
Payer: COMMERCIAL

## 2025-01-29 ENCOUNTER — OFFICE VISIT (OUTPATIENT)
Dept: URGENT CARE | Facility: PHYSICIAN GROUP | Age: 40
End: 2025-01-29
Payer: COMMERCIAL

## 2025-01-29 ENCOUNTER — APPOINTMENT (OUTPATIENT)
Dept: RADIOLOGY | Facility: MEDICAL CENTER | Age: 40
End: 2025-01-29
Attending: EMERGENCY MEDICINE
Payer: COMMERCIAL

## 2025-01-29 VITALS
HEART RATE: 84 BPM | WEIGHT: 202.6 LBS | RESPIRATION RATE: 20 BRPM | HEIGHT: 67 IN | OXYGEN SATURATION: 94 % | DIASTOLIC BLOOD PRESSURE: 98 MMHG | BODY MASS INDEX: 31.8 KG/M2 | TEMPERATURE: 97.3 F | SYSTOLIC BLOOD PRESSURE: 153 MMHG

## 2025-01-29 VITALS
DIASTOLIC BLOOD PRESSURE: 90 MMHG | WEIGHT: 203 LBS | BODY MASS INDEX: 31.86 KG/M2 | TEMPERATURE: 98.3 F | HEART RATE: 104 BPM | HEIGHT: 67 IN | OXYGEN SATURATION: 97 % | SYSTOLIC BLOOD PRESSURE: 146 MMHG | RESPIRATION RATE: 16 BRPM

## 2025-01-29 DIAGNOSIS — H65.01 NON-RECURRENT ACUTE SEROUS OTITIS MEDIA OF RIGHT EAR: ICD-10-CM

## 2025-01-29 DIAGNOSIS — H66.001 NON-RECURRENT ACUTE SUPPURATIVE OTITIS MEDIA OF RIGHT EAR WITHOUT SPONTANEOUS RUPTURE OF TYMPANIC MEMBRANE: ICD-10-CM

## 2025-01-29 DIAGNOSIS — H57.9 RIGHT EYE SYMPTOMS: ICD-10-CM

## 2025-01-29 DIAGNOSIS — H20.9 IRITIS OF RIGHT EYE: ICD-10-CM

## 2025-01-29 LAB
ANION GAP SERPL CALC-SCNC: 15 MMOL/L (ref 7–16)
BASOPHILS # BLD AUTO: 0.3 % (ref 0–1.8)
BASOPHILS # BLD: 0.02 K/UL (ref 0–0.12)
BUN SERPL-MCNC: 12 MG/DL (ref 8–22)
CALCIUM SERPL-MCNC: 9.5 MG/DL (ref 8.5–10.5)
CHLORIDE SERPL-SCNC: 98 MMOL/L (ref 96–112)
CO2 SERPL-SCNC: 26 MMOL/L (ref 20–33)
CREAT SERPL-MCNC: 0.97 MG/DL (ref 0.5–1.4)
EOSINOPHIL # BLD AUTO: 0.07 K/UL (ref 0–0.51)
EOSINOPHIL NFR BLD: 1 % (ref 0–6.9)
ERYTHROCYTE [DISTWIDTH] IN BLOOD BY AUTOMATED COUNT: 43.1 FL (ref 35.9–50)
GFR SERPLBLD CREATININE-BSD FMLA CKD-EPI: 102 ML/MIN/1.73 M 2
GLUCOSE SERPL-MCNC: 135 MG/DL (ref 65–99)
HCT VFR BLD AUTO: 46.1 % (ref 42–52)
HGB BLD-MCNC: 16.2 G/DL (ref 14–18)
IMM GRANULOCYTES # BLD AUTO: 0.04 K/UL (ref 0–0.11)
IMM GRANULOCYTES NFR BLD AUTO: 0.6 % (ref 0–0.9)
LYMPHOCYTES # BLD AUTO: 1.57 K/UL (ref 1–4.8)
LYMPHOCYTES NFR BLD: 23.4 % (ref 22–41)
MCH RBC QN AUTO: 32.3 PG (ref 27–33)
MCHC RBC AUTO-ENTMCNC: 35.1 G/DL (ref 32.3–36.5)
MCV RBC AUTO: 91.8 FL (ref 81.4–97.8)
MONOCYTES # BLD AUTO: 0.56 K/UL (ref 0–0.85)
MONOCYTES NFR BLD AUTO: 8.3 % (ref 0–13.4)
NEUTROPHILS # BLD AUTO: 4.46 K/UL (ref 1.82–7.42)
NEUTROPHILS NFR BLD: 66.4 % (ref 44–72)
NRBC # BLD AUTO: 0 K/UL
NRBC BLD-RTO: 0 /100 WBC (ref 0–0.2)
PLATELET # BLD AUTO: 152 K/UL (ref 164–446)
PMV BLD AUTO: 8.9 FL (ref 9–12.9)
POTASSIUM SERPL-SCNC: 3.9 MMOL/L (ref 3.6–5.5)
RBC # BLD AUTO: 5.02 M/UL (ref 4.7–6.1)
SODIUM SERPL-SCNC: 139 MMOL/L (ref 135–145)
WBC # BLD AUTO: 6.7 K/UL (ref 4.8–10.8)

## 2025-01-29 PROCEDURE — 70496 CT ANGIOGRAPHY HEAD: CPT

## 2025-01-29 PROCEDURE — 99284 EMERGENCY DEPT VISIT MOD MDM: CPT

## 2025-01-29 PROCEDURE — 80048 BASIC METABOLIC PNL TOTAL CA: CPT

## 2025-01-29 PROCEDURE — 700117 HCHG RX CONTRAST REV CODE 255: Performed by: EMERGENCY MEDICINE

## 2025-01-29 PROCEDURE — 70498 CT ANGIOGRAPHY NECK: CPT

## 2025-01-29 PROCEDURE — 700101 HCHG RX REV CODE 250: Performed by: EMERGENCY MEDICINE

## 2025-01-29 PROCEDURE — 85025 COMPLETE CBC W/AUTO DIFF WBC: CPT

## 2025-01-29 PROCEDURE — 36415 COLL VENOUS BLD VENIPUNCTURE: CPT

## 2025-01-29 PROCEDURE — 99214 OFFICE O/P EST MOD 30 MIN: CPT | Performed by: STUDENT IN AN ORGANIZED HEALTH CARE EDUCATION/TRAINING PROGRAM

## 2025-01-29 RX ORDER — PROPARACAINE HYDROCHLORIDE 5 MG/ML
1 SOLUTION/ DROPS OPHTHALMIC ONCE
Status: COMPLETED | OUTPATIENT
Start: 2025-01-29 | End: 2025-01-29

## 2025-01-29 RX ADMIN — PROPARACAINE HYDROCHLORIDE 1 DROP: 5 SOLUTION/ DROPS OPHTHALMIC at 21:00

## 2025-01-29 RX ADMIN — IOHEXOL 80 ML: 350 INJECTION, SOLUTION INTRAVENOUS at 22:32

## 2025-01-29 ASSESSMENT — ENCOUNTER SYMPTOMS
DOUBLE VISION: 0
CHILLS: 0
PHOTOPHOBIA: 1
EYE PAIN: 1
BLURRED VISION: 0
FEVER: 0
EYE DISCHARGE: 1
EYE REDNESS: 1

## 2025-01-30 NOTE — DISCHARGE INSTRUCTIONS
Please  and take and complete your antibiotics.  Please bring your glasses to Dr. Santos's office in the morning.

## 2025-01-30 NOTE — ED TRIAGE NOTES
Chief Complaint   Patient presents with    Sent from Urgent Care     [facility]RGENT CARE [facility]: Angle closure glaucoma vs severe   Unequal pupils  Redness of R eye      Pt said that this morning he felt that his right eye is irritated, light sensitive - denied any trauma     Pain:  5/10  Ambulatory:  Yes  Alert and Oriented: x 4  Oxygen Treatment: No    Pt came in to triage for the above complaints.     Pt is speaking in full sentences, follows commands and responds appropriately to questions.     Respirations are even and unlabored.    Pt placed in lobby. Pt educated on triage process.     Pt encouraged to inform staff for any changes in condition or if needs help while waiting to be room in.      Vitals:    01/29/25 1935   BP: (!) 165/113   Pulse: 100   Resp: 17   Temp: 36.3 °C (97.3 °F)   SpO2: 98%

## 2025-01-30 NOTE — PROGRESS NOTES
Subjective:   Ron Lagunas is a 39 y.o. male who presents for Earache (R EAR PAIN, POSSIBLE EYE INFECTION, ONSET YESTERDAY )      HPI:  The 89 male presents with 2 concerns at this time.  1. ear pain right ear that started yesterday gradually getting worse.  Feeling of fullness and pressure on the right side.  Believes he might have an ear infection.  Denies any fevers or chills.  2 right eye pain discomfort redness and severe tearing and photophobia starting this morning.  He reports that it seemed slightly irritated increased tearing the last night some increased redness as well but reports starting this morning severe worsening of his eye discomfort and photophobia throughout the day which prompted him to be evaluated at urgent care.  He denies any significant changes in vision on the right side he reports his lots of tearing and watering but no significant itching.  He reports his eyes feel irritated but denies any foreign body sensation denies any trauma or injury to the eye.  He does not wear contacts.  Patient is wearing sunglasses as well as deep barbosa to keep any lights off of his eye.        Review of Systems   Constitutional:  Negative for chills and fever.   HENT:  Positive for ear pain.    Eyes:  Positive for photophobia, pain, discharge and redness. Negative for blurred vision and double vision.       Medications:    amLODIPine Tabs    Allergies: Patient has no known allergies.    Problem List: Ron Lagunas does not have any pertinent problems on file.    Surgical History:  No past surgical history on file.    Past Social Hx: Ron Lagunas  reports that he has been smoking cigarettes. He has a 9 pack-year smoking history. He has quit using smokeless tobacco. He reports current alcohol use. He reports current drug use. Drug: Marijuana.     Past Family Hx:  Ron Lagunas family history includes Diabetes in his mother; Stroke in his father.     Problem list, medications, and allergies  "reviewed by myself today in Epic.     Objective:     BP (!) 146/90 (BP Location: Right arm, Patient Position: Sitting, BP Cuff Size: Adult)   Pulse (!) 104   Temp 36.8 °C (98.3 °F) (Temporal)   Resp 16   Ht 1.702 m (5' 7\")   Wt 92.1 kg (203 lb)   SpO2 97%   BMI 31.79 kg/m²     Physical Exam  Constitutional:       Appearance: Normal appearance.   HENT:      Head: Normocephalic and atraumatic.      Left Ear: Tympanic membrane normal.      Ears:      Comments: Right tympanic membrane significant erythema and fluid level/bulging noted.     Nose: Rhinorrhea present. No congestion.      Mouth/Throat:      Pharynx: No oropharyngeal exudate or posterior oropharyngeal erythema.   Eyes:      General: No visual field deficit.        Right eye: Discharge present. No foreign body.      Extraocular Movements:      Right eye: Normal extraocular motion and no nystagmus.      Left eye: Normal extraocular motion and no nystagmus.      Conjunctiva/sclera:      Right eye: Right conjunctiva is injected. Chemosis and exudate present.      Pupils: Pupils are unequal.      Right eye: Pupil is sluggish. No corneal abrasion.      Slit lamp exam:     Right eye: Photophobia present.      Comments: Right pupil was less dilated and sluggish to react with less reactivity as compared to left.   Neurological:      Mental Status: He is alert.         Assessment/Plan:     Diagnosis and associated orders:     1. Non-recurrent acute serous otitis media of right ear  amoxicillin-clavulanate (AUGMENTIN) 875-125 MG Tab      2. Right eye symptoms           Comments/MDM:   1. Non-recurrent acute serous otitis media of right ear  Evidence of otitis media of the right ear  - Will treat with Augmentin as below  - Return precaution discussed including significant worsening of ear pain changes in hearing or no improvement with antibiotics.  - amoxicillin-clavulanate (AUGMENTIN) 875-125 MG Tab; Take 1 Tablet by mouth 2 times a day for 7 days.  Dispense: 14 " Tablet; Refill: 0    2. Right eye symptoms  Patient presents with significant chemosis and conjunctival injection on the right eye.  Pupils are sluggish to react and equal as compared to left.  Significant photophobia on the right eye.  Concern at this time for acute angle-closure glaucoma versus infective keratitis or superacute bacterial conjunctivitis.  Due to the significant eye concerns I recommend evaluation in the emergency department as I am unable to provide a same-day ophthalmology emergent referral or test eye pressures.  -Patient agreeable with discharge plan at this time and will present immediately to [facility] emergency department.  - Transfer center called and notified of patient's transfer by own vehicle.  -I discussed that not being seen for further evaluation may result in significant impairments of vision of the right eye if any of my major concerns were correct.  This can include his loss of vision on that side.         Differential diagnosis, natural history, supportive care, and indications for immediate follow-up discussed.    Advised the patient to follow-up with the primary care physician for recheck, reevaluation, and consideration of further management.    Please note that this dictation was created using voice recognition software. I have made a reasonable attempt to correct obvious errors, but I expect that there are errors of grammar and possibly content that I did not discover before finalizing the note.    Yoandy Boyd M.D.

## 2025-01-30 NOTE — ED NOTES
Visual acuity completed. Patient does wear glasses. Patient did not have his glasses for exam.  LEFT: 20/100 -1  RIGHT: 20/100 -1  BOTH: 20/70 -1

## 2025-01-30 NOTE — ED PROVIDER NOTES
ED Provider Note    CHIEF COMPLAINT  Chief Complaint   Patient presents with    Sent from Urgent Care     [facility] URGENT CARE [facility]: Angle closure glaucoma vs severe   Unequal pupils  Redness of R eye      Pt said that this morning he felt that his right eye is irritated, light sensitive - denied any trauma       EXTERNAL RECORDS REVIEWED  Outpatient Notes seen in urgent care and diagnosed with otitis media the right side and then concern for eye diagnosis iritis glaucoma of the right eye    HPI/ROS  LIMITATION TO HISTORY   Select: : None  OUTSIDE HISTORIAN(S):  millie    Ron Lagunas is a 39 y.o. male who presents to the emergency department chief complaint of right eye discomfort.  He states that last night when he went to bed his ear was hurting little bit and his eye felt a little irritated and as the day has progressively gotten on his right eyes become significantly more irritated with light sensitivity and little increased blurry vision.  He states he went to urgent care and they sent him here because his pupils were different sizes.  He denies any trauma to that side he was recently last week of the viral respiratory illness and states that is gotten better.  He does not take any medicines regularly he does smoke cigarettes but has quit he uses marijuana occasionally.  He also wears glasses but did not bring them.  He denies any other unilateral weakness numbness or tingling    PAST MEDICAL HISTORY   has a past medical history of Hypertension.    SURGICAL HISTORY  patient denies any surgical history    FAMILY HISTORY  Family History   Problem Relation Age of Onset    Diabetes Mother     Stroke Father        SOCIAL HISTORY  Social History     Tobacco Use    Smoking status: Some Days     Current packs/day: 0.50     Average packs/day: 0.5 packs/day for 18.0 years (9.0 ttl pk-yrs)     Types: Cigarettes    Smokeless tobacco: Former    Tobacco comments:     occasional   Vaping Use    Vaping status:  "Never Used   Substance and Sexual Activity    Alcohol use: Yes     Comment: per pt 2-3 times a week    Drug use: Yes     Types: Marijuana    Sexual activity: Yes     Partners: Female     Birth control/protection: Surgical     Comment: pt girlfriend had hysterectomy       CURRENT MEDICATIONS  Home Medications       Reviewed by Miguel Angel Inman R.N. (Registered Nurse) on 01/29/25 at 1952  Med List Status: Partial     Medication Last Dose Status   amLODIPine (NORVASC) 5 MG Tab  Active   amoxicillin-clavulanate (AUGMENTIN) 875-125 MG Tab  Active                    ALLERGIES  No Known Allergies    PHYSICAL EXAM  VITAL SIGNS: BP (!) 165/113   Pulse 100   Temp 36.3 °C (97.3 °F) (Temporal)   Resp 17   Ht 1.702 m (5' 7\")   Wt 91.9 kg (202 lb 9.6 oz)   SpO2 98%   BMI 31.73 kg/m²    Pulse OX: Pulse Oxygen level is within normal limits on room air  Constitutional: Alert in no apparent distress.  HENT: Normocephalic, Atraumatic, MMM  Eyes: Right pupil constricted about 2 mm left pupil in the dark room dilated to 6 right significantly injected conjunctiva with tearing, non-icteric.  Question if the right upper eyelid is mildly drooped or just irritated and mildly swollen significant erythema or induration  IOP R 17, IOP L 23  Visual acuity completed. Patient does wear glasses. Patient did not have his glasses for exam.  LEFT: 20/100 -1  RIGHT: 20/100 -1  BOTH: 20/70 -1  Heart: Regular rate and rhythm, intact distal pulses   Lungs: Symmetrical movement, no resp distress   Abdomen: Non-tender, non-distended, normal bowel sounds  EXT/Back no edema  Skin: Warm, Dry, No erythema, No rash.   Neurologic: Alert and oriented, Grossly non-focal.       EKG/LABS  Labs Reviewed   CBC WITH DIFFERENTIAL - Abnormal; Notable for the following components:       Result Value    Platelet Count 152 (*)     MPV 8.9 (*)     All other components within normal limits   BASIC METABOLIC PANEL - Abnormal; Notable for the following components:    " Glucose 135 (*)     All other components within normal limits   ESTIMATED GFR       I have independently interpreted this EKG    RADIOLOGY/PROCEDURES   I have independently interpreted the diagnostic imaging associated with this visit and am waiting the final reading from the radiologist.   My preliminary interpretation is as follows:     CTA head without evidence of mass or aneurysm or dissection, no evidence of mastoiditis on the right  CTA neck no evidence of dissection    Radiologist interpretation:  CT-CTA NECK WITH & W/O-POST PROCESSING   Final Result      CT angiogram of the neck within normal limits.      CT-CTA HEAD WITH & W/O-POST PROCESS   Final Result      CT angiogram of the [place] within normal limits.      No acute intracranial abnormality.      Chiari I malformation.      Paranasal sinus disease.          COURSE & MEDICAL DECISION MAKING    ASSESSMENT, COURSE AND PLAN  Care Narrative:     Patient is a 39-year-old male who presents to the [facility] with possible supposed acute angle glaucoma however his pressure is actually low to normal.  However he does have significant injection of right eye with light sensitivity and this otitis media on the right and this questionable may be upper eyelid droop.  No Tej Justin pupil on examination but there is concerning for possible Chapo syndrome versus an iritis of the side.  Due to my concern for possible Chapo's with this pupil this diminished and fixed questionable lowering of the eyelid difficult to tell if he has an hydrocyst CTA of the head and neck were ordered.  Will also consult ophthalmology for concern for possible iritis without signs of acute angle-closure glaucoma.  His visual acuity is quite terrible without his glasses on both eyes    DISPOSITION AND DISCUSSIONS  10:49 PM  Spoke w Dr Santos with ophthalmology who recommends follow-up in his clinic tomorrow for evaluation of iritis      I reassessed patient at the bedside we  discussed his CT scan findings at this time there is no evidence of any acute pathology at this time no signs of mastoiditis mass or anything else would be causing the worsen besides may be just the otitis media.  However either way he still needs to follow-up with ophthalmology for iritis and a follow-up with them in the morning.  He understands he feels comfortable this plan will bring his glasses and return to the ED for any new worsening issues      I have discussed management of the patient with the following physicians and BRIAN's: Tom    Discussion of management with other QHP or appropriate source(s): None     Escalation of care considered, and ultimately not performed:acute inpatient care management, however at this time, the patient is most appropriate for outpatient management    Barriers to care at this time, including but not limited to:  na .     Decision tools and prescription drugs considered including, but not limited to:  na .    The patient will return for new or worsening symptoms and is stable at the time of discharge.    The patient is referred to a primary physician for blood pressure management, diabetic screening, and for all other preventative health concerns.    DISPOSITION:  Patient will be discharged home in stable condition.    FOLLOW UP:  Juan Santos M.D.  [address]  [address]  [facility] [address]  [phone]    Call on 1/30/2025  at 8 am    [facility], Emergency Dept  [facility] [facility] [id number] [phone]    If symptoms worsen      OUTPATIENT MEDICATIONS:  Discharge Medication List as of 1/29/2025 11:26 PM            FINAL DIAGNOSIS  1. Non-recurrent acute suppurative otitis media of right ear without spontaneous rupture of tympanic membrane    2. Iritis of right eye         Electronically signed by: Marleny Horn M.D., 1/29/2025 8:36 PM

## 2025-01-30 NOTE — ED NOTES
Patient discharged in stable condition per orders. IV access removed - bandage applied. Patient verbalized understanding of all discharge instructions. All belongings accounted for. Patient ambulatory to ER lobby with steady gait.